# Patient Record
Sex: FEMALE | Employment: FULL TIME | URBAN - METROPOLITAN AREA
[De-identification: names, ages, dates, MRNs, and addresses within clinical notes are randomized per-mention and may not be internally consistent; named-entity substitution may affect disease eponyms.]

---

## 2020-10-20 ENCOUNTER — HOSPITAL ENCOUNTER (INPATIENT)
Age: 35
LOS: 1 days | Discharge: ANOTHER HOSPITAL | DRG: 885 | End: 2020-10-21
Attending: EMERGENCY MEDICINE | Admitting: PSYCHIATRY & NEUROLOGY
Payer: COMMERCIAL

## 2020-10-20 ENCOUNTER — APPOINTMENT (OUTPATIENT)
Dept: CT IMAGING | Age: 35
DRG: 885 | End: 2020-10-20
Attending: NURSE PRACTITIONER
Payer: COMMERCIAL

## 2020-10-20 VITALS
WEIGHT: 130 LBS | DIASTOLIC BLOOD PRESSURE: 74 MMHG | RESPIRATION RATE: 19 BRPM | OXYGEN SATURATION: 98 % | TEMPERATURE: 98.8 F | HEART RATE: 81 BPM | BODY MASS INDEX: 23.03 KG/M2 | SYSTOLIC BLOOD PRESSURE: 112 MMHG

## 2020-10-20 DIAGNOSIS — F32.A DEPRESSION, UNSPECIFIED DEPRESSION TYPE: Primary | ICD-10-CM

## 2020-10-20 PROBLEM — F32.9 MDD (MAJOR DEPRESSIVE DISORDER): Status: ACTIVE | Noted: 2020-10-20

## 2020-10-20 LAB
ALBUMIN SERPL-MCNC: 3.6 G/DL (ref 3.5–5)
ALBUMIN/GLOB SERPL: 1.3 {RATIO} (ref 1.1–2.2)
ALP SERPL-CCNC: 53 U/L (ref 45–117)
ALT SERPL-CCNC: 12 U/L (ref 12–78)
AMPHET UR QL SCN: POSITIVE
ANION GAP SERPL CALC-SCNC: 11 MMOL/L (ref 5–15)
APAP SERPL-MCNC: <2 UG/ML (ref 10–30)
APPEARANCE UR: ABNORMAL
AST SERPL-CCNC: 14 U/L (ref 15–37)
BACTERIA URNS QL MICRO: NEGATIVE /HPF
BARBITURATES UR QL SCN: NEGATIVE
BASOPHILS # BLD: 0 K/UL (ref 0–0.1)
BASOPHILS NFR BLD: 0 % (ref 0–1)
BENZODIAZ UR QL: POSITIVE
BILIRUB SERPL-MCNC: 0.3 MG/DL (ref 0.2–1)
BILIRUB UR QL: NEGATIVE
BUN SERPL-MCNC: 12 MG/DL (ref 6–20)
BUN/CREAT SERPL: 12 (ref 12–20)
CALCIUM SERPL-MCNC: 8.3 MG/DL (ref 8.5–10.1)
CANNABINOIDS UR QL SCN: POSITIVE
CHLORIDE SERPL-SCNC: 104 MMOL/L (ref 97–108)
CO2 SERPL-SCNC: 26 MMOL/L (ref 21–32)
COCAINE UR QL SCN: NEGATIVE
COLOR UR: ABNORMAL
COVID-19 RAPID TEST, COVR: NOT DETECTED
CREAT SERPL-MCNC: 0.97 MG/DL (ref 0.55–1.02)
DIFFERENTIAL METHOD BLD: ABNORMAL
DRUG SCRN COMMENT,DRGCM: ABNORMAL
EOSINOPHIL # BLD: 0 K/UL (ref 0–0.4)
EOSINOPHIL NFR BLD: 0 % (ref 0–7)
EPITH CASTS URNS QL MICRO: ABNORMAL /LPF
ERYTHROCYTE [DISTWIDTH] IN BLOOD BY AUTOMATED COUNT: 13.5 % (ref 11.5–14.5)
ETHANOL SERPL-MCNC: <10 MG/DL
GLOBULIN SER CALC-MCNC: 2.8 G/DL (ref 2–4)
GLUCOSE SERPL-MCNC: 94 MG/DL (ref 65–100)
GLUCOSE UR STRIP.AUTO-MCNC: NEGATIVE MG/DL
HCG UR QL: NEGATIVE
HCT VFR BLD AUTO: 30.6 % (ref 35–47)
HEALTH STATUS, XMCV2T: NORMAL
HGB BLD-MCNC: 10.3 G/DL (ref 11.5–16)
HGB UR QL STRIP: ABNORMAL
IMM GRANULOCYTES # BLD AUTO: 0 K/UL (ref 0–0.04)
IMM GRANULOCYTES NFR BLD AUTO: 0 % (ref 0–0.5)
KETONES UR QL STRIP.AUTO: ABNORMAL MG/DL
LEUKOCYTE ESTERASE UR QL STRIP.AUTO: ABNORMAL
LYMPHOCYTES # BLD: 1.8 K/UL (ref 0.8–3.5)
LYMPHOCYTES NFR BLD: 25 % (ref 12–49)
MCH RBC QN AUTO: 29 PG (ref 26–34)
MCHC RBC AUTO-ENTMCNC: 33.7 G/DL (ref 30–36.5)
MCV RBC AUTO: 86.2 FL (ref 80–99)
METHADONE UR QL: NEGATIVE
MONOCYTES # BLD: 0.6 K/UL (ref 0–1)
MONOCYTES NFR BLD: 8 % (ref 5–13)
NEUTS SEG # BLD: 4.9 K/UL (ref 1.8–8)
NEUTS SEG NFR BLD: 67 % (ref 32–75)
NITRITE UR QL STRIP.AUTO: NEGATIVE
NRBC # BLD: 0 K/UL (ref 0–0.01)
NRBC BLD-RTO: 0 PER 100 WBC
OPIATES UR QL: NEGATIVE
PCP UR QL: NEGATIVE
PH UR STRIP: 6 [PH] (ref 5–8)
PLATELET # BLD AUTO: 246 K/UL (ref 150–400)
PMV BLD AUTO: 9.8 FL (ref 8.9–12.9)
POTASSIUM SERPL-SCNC: 3.6 MMOL/L (ref 3.5–5.1)
PROT SERPL-MCNC: 6.4 G/DL (ref 6.4–8.2)
PROT UR STRIP-MCNC: NEGATIVE MG/DL
RBC # BLD AUTO: 3.55 M/UL (ref 3.8–5.2)
RBC #/AREA URNS HPF: ABNORMAL /HPF (ref 0–5)
SALICYLATES SERPL-MCNC: 1.9 MG/DL (ref 2.8–20)
SARS-COV-2, COV2: NOT DETECTED
SODIUM SERPL-SCNC: 141 MMOL/L (ref 136–145)
SOURCE, COVRS: NORMAL
SP GR UR REFRACTOMETRY: 1.01 (ref 1–1.03)
SPECIMEN SOURCE, FCOV2M: NORMAL
SPECIMEN SOURCE, FCOV2M: NORMAL
SPECIMEN TYPE, XMCV1T: NORMAL
UA: UC IF INDICATED,UAUC: ABNORMAL
UROBILINOGEN UR QL STRIP.AUTO: 1 EU/DL (ref 0.2–1)
WBC # BLD AUTO: 7.4 K/UL (ref 3.6–11)
WBC URNS QL MICRO: ABNORMAL /HPF (ref 0–4)

## 2020-10-20 PROCEDURE — 81001 URINALYSIS AUTO W/SCOPE: CPT

## 2020-10-20 PROCEDURE — 36415 COLL VENOUS BLD VENIPUNCTURE: CPT

## 2020-10-20 PROCEDURE — 85025 COMPLETE CBC W/AUTO DIFF WBC: CPT

## 2020-10-20 PROCEDURE — 87635 SARS-COV-2 COVID-19 AMP PRB: CPT

## 2020-10-20 PROCEDURE — 80053 COMPREHEN METABOLIC PANEL: CPT

## 2020-10-20 PROCEDURE — 74011250636 HC RX REV CODE- 250/636: Performed by: PHYSICIAN ASSISTANT

## 2020-10-20 PROCEDURE — 81025 URINE PREGNANCY TEST: CPT

## 2020-10-20 PROCEDURE — 99285 EMERGENCY DEPT VISIT HI MDM: CPT

## 2020-10-20 PROCEDURE — 65270000029 HC RM PRIVATE

## 2020-10-20 PROCEDURE — 74011250636 HC RX REV CODE- 250/636: Performed by: NURSE PRACTITIONER

## 2020-10-20 PROCEDURE — 70450 CT HEAD/BRAIN W/O DYE: CPT

## 2020-10-20 PROCEDURE — 80307 DRUG TEST PRSMV CHEM ANLYZR: CPT

## 2020-10-20 RX ORDER — ALPRAZOLAM 1 MG/1
2 TABLET ORAL
COMMUNITY

## 2020-10-20 RX ORDER — NALOXONE HYDROCHLORIDE 1 MG/ML
2 INJECTION INTRAMUSCULAR; INTRAVENOUS; SUBCUTANEOUS
Status: COMPLETED | OUTPATIENT
Start: 2020-10-20 | End: 2020-10-20

## 2020-10-20 RX ORDER — NALOXONE HYDROCHLORIDE 1 MG/ML
2 INJECTION INTRAMUSCULAR; INTRAVENOUS; SUBCUTANEOUS ONCE
Status: COMPLETED | OUTPATIENT
Start: 2020-10-20 | End: 2020-10-20

## 2020-10-20 RX ORDER — PROPRANOLOL HYDROCHLORIDE 60 MG/1
60 CAPSULE, EXTENDED RELEASE ORAL DAILY
COMMUNITY

## 2020-10-20 RX ORDER — SERTRALINE HYDROCHLORIDE 100 MG/1
100 TABLET, FILM COATED ORAL DAILY
COMMUNITY

## 2020-10-20 RX ADMIN — NALOXONE HYDROCHLORIDE 2 MG: 1 INJECTION PARENTERAL at 14:36

## 2020-10-20 RX ADMIN — NALOXONE HYDROCHLORIDE 2 MG: 1 INJECTION PARENTERAL at 15:25

## 2020-10-20 RX ADMIN — SODIUM CHLORIDE 1000 ML: 900 INJECTION, SOLUTION INTRAVENOUS at 18:53

## 2020-10-20 NOTE — ED NOTES
Emergency Department Nursing Plan of Care The Nursing Plan of Care is developed from the Nursing assessment and Emergency Department Attending provider initial evaluation. The plan of care may be reviewed in the ED Provider note. The Plan of Care was developed with the following considerations:  
Patient / Family readiness to learn indicated by:appropriate questions asked Persons(s) to be included in education: patient Barriers to Learning/Limitations:PT under ECO/TDO Signed Bakari Wagner RN   
10/20/2020   2:49 PM

## 2020-10-20 NOTE — ED NOTES
Upon arrival pt noted to be drowsy and not answering questions. Pt able to verify her birthday and SSN.
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
## 2020-10-20 NOTE — ED NOTES
Bedside and Verbal shift change report given to Parth Correia RN (oncoming nurse) by Isaura Posada RN (offgoing nurse). Report included the following information SBAR, ED Summary, MAR and Recent Results.

## 2020-10-20 NOTE — ED NOTES
Pt still appears drowsy after receiving Narcan intranasally Provider GrayNP at bedside when Narcan was administered.

## 2020-10-20 NOTE — ED NOTES
7th hour TDO at bedside with [employer] officer. Assisted pt to bedside commode for urine sample. Steady gait and staff assist x1. Pt very tearful asking if she is allowed to have visitors. [employer] removed bilateral wrist cuffs at this time. Skin intact.
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
## 2020-10-21 ENCOUNTER — HOSPITAL ENCOUNTER (INPATIENT)
Age: 35
LOS: 1 days | Discharge: HOME | DRG: 885 | End: 2020-10-21
Attending: PSYCHIATRY & NEUROLOGY | Admitting: PSYCHIATRY & NEUROLOGY
Payer: COMMERCIAL

## 2020-10-21 VITALS
RESPIRATION RATE: 16 BRPM | WEIGHT: 130 LBS | OXYGEN SATURATION: 99 % | TEMPERATURE: 97.8 F | HEART RATE: 89 BPM | BODY MASS INDEX: 21.66 KG/M2 | HEIGHT: 65 IN | SYSTOLIC BLOOD PRESSURE: 124 MMHG | DIASTOLIC BLOOD PRESSURE: 80 MMHG

## 2020-10-21 PROCEDURE — 74011250637 HC RX REV CODE- 250/637: Performed by: NURSE PRACTITIONER

## 2020-10-21 PROCEDURE — 65220000003 HC RM SEMIPRIVATE PSYCH

## 2020-10-21 RX ORDER — OLANZAPINE 5 MG/1
5 TABLET ORAL
Status: DISCONTINUED | OUTPATIENT
Start: 2020-10-21 | End: 2020-10-21 | Stop reason: HOSPADM

## 2020-10-21 RX ORDER — DIPHENHYDRAMINE HYDROCHLORIDE 50 MG/ML
50 INJECTION, SOLUTION INTRAMUSCULAR; INTRAVENOUS
Status: DISCONTINUED | OUTPATIENT
Start: 2020-10-21 | End: 2020-10-21 | Stop reason: HOSPADM

## 2020-10-21 RX ORDER — IBUPROFEN 400 MG/1
400 TABLET ORAL
Status: DISCONTINUED | OUTPATIENT
Start: 2020-10-21 | End: 2020-10-21 | Stop reason: HOSPADM

## 2020-10-21 RX ORDER — PHENOBARBITAL 64.8 MG/1
64.8 TABLET ORAL
Status: DISCONTINUED | OUTPATIENT
Start: 2020-10-21 | End: 2020-10-21 | Stop reason: HOSPADM

## 2020-10-21 RX ORDER — PHENOBARBITAL 32.4 MG/1
32.4 TABLET ORAL 4 TIMES DAILY
Status: DISCONTINUED | OUTPATIENT
Start: 2020-10-22 | End: 2020-10-21 | Stop reason: HOSPADM

## 2020-10-21 RX ORDER — PHENOBARBITAL 64.8 MG/1
64.8 TABLET ORAL 4 TIMES DAILY
Status: DISCONTINUED | OUTPATIENT
Start: 2020-10-21 | End: 2020-10-21 | Stop reason: HOSPADM

## 2020-10-21 RX ORDER — DEXTROAMPHETAMINE SACCHARATE, AMPHETAMINE ASPARTATE, DEXTROAMPHETAMINE SULFATE AND AMPHETAMINE SULFATE 5; 5; 5; 5 MG/1; MG/1; MG/1; MG/1
20 TABLET ORAL 3 TIMES DAILY
COMMUNITY

## 2020-10-21 RX ORDER — TRAZODONE HYDROCHLORIDE 50 MG/1
50 TABLET ORAL
Status: DISCONTINUED | OUTPATIENT
Start: 2020-10-21 | End: 2020-10-21

## 2020-10-21 RX ORDER — LORAZEPAM 2 MG/ML
1 INJECTION INTRAMUSCULAR
Status: DISCONTINUED | OUTPATIENT
Start: 2020-10-21 | End: 2020-10-21 | Stop reason: HOSPADM

## 2020-10-21 RX ORDER — HALOPERIDOL 5 MG/ML
5 INJECTION INTRAMUSCULAR
Status: DISCONTINUED | OUTPATIENT
Start: 2020-10-21 | End: 2020-10-21 | Stop reason: HOSPADM

## 2020-10-21 RX ORDER — CYCLOBENZAPRINE HCL 10 MG
10 TABLET ORAL DAILY
COMMUNITY

## 2020-10-21 RX ORDER — ADHESIVE BANDAGE
30 BANDAGE TOPICAL DAILY PRN
Status: DISCONTINUED | OUTPATIENT
Start: 2020-10-21 | End: 2020-10-21 | Stop reason: HOSPADM

## 2020-10-21 RX ORDER — HYDROXYZINE 50 MG/1
50 TABLET, FILM COATED ORAL
Status: DISCONTINUED | OUTPATIENT
Start: 2020-10-21 | End: 2020-10-21 | Stop reason: HOSPADM

## 2020-10-21 RX ORDER — PHENOBARBITAL 32.4 MG/1
32.4 TABLET ORAL 2 TIMES DAILY
Status: DISCONTINUED | OUTPATIENT
Start: 2020-10-23 | End: 2020-10-21 | Stop reason: HOSPADM

## 2020-10-21 RX ORDER — SPIRONOLACTONE 25 MG/1
25 TABLET ORAL
COMMUNITY

## 2020-10-21 RX ORDER — PHENOBARBITAL 32.4 MG/1
16.2 TABLET ORAL
Status: DISCONTINUED | OUTPATIENT
Start: 2020-10-24 | End: 2020-10-21 | Stop reason: HOSPADM

## 2020-10-21 RX ORDER — SERTRALINE HYDROCHLORIDE 50 MG/1
100 TABLET, FILM COATED ORAL DAILY
Status: DISCONTINUED | OUTPATIENT
Start: 2020-10-21 | End: 2020-10-21 | Stop reason: HOSPADM

## 2020-10-21 RX ORDER — ACETAMINOPHEN 325 MG/1
650 TABLET ORAL
Status: DISCONTINUED | OUTPATIENT
Start: 2020-10-21 | End: 2020-10-21 | Stop reason: HOSPADM

## 2020-10-21 RX ORDER — AMITRIPTYLINE HYDROCHLORIDE 25 MG/1
25 TABLET, FILM COATED ORAL
COMMUNITY

## 2020-10-21 RX ORDER — PHENOBARBITAL 32.4 MG/1
16.2 TABLET ORAL 2 TIMES DAILY
Status: DISCONTINUED | OUTPATIENT
Start: 2020-10-24 | End: 2020-10-21 | Stop reason: HOSPADM

## 2020-10-21 RX ORDER — PHENOBARBITAL 64.8 MG/1
60 TABLET ORAL
Status: DISCONTINUED | OUTPATIENT
Start: 2020-10-21 | End: 2020-10-21

## 2020-10-21 RX ORDER — ONDANSETRON HYDROCHLORIDE 8 MG/1
8 TABLET, FILM COATED ORAL
COMMUNITY

## 2020-10-21 RX ORDER — PHENOBARBITAL 32.4 MG/1
32.4 TABLET ORAL
Status: DISCONTINUED | OUTPATIENT
Start: 2020-10-22 | End: 2020-10-21 | Stop reason: HOSPADM

## 2020-10-21 RX ORDER — BENZTROPINE MESYLATE 1 MG/1
1 TABLET ORAL
Status: DISCONTINUED | OUTPATIENT
Start: 2020-10-21 | End: 2020-10-21 | Stop reason: HOSPADM

## 2020-10-21 RX ORDER — AMITRIPTYLINE HYDROCHLORIDE 25 MG/1
25 TABLET, FILM COATED ORAL
Status: DISCONTINUED | OUTPATIENT
Start: 2020-10-21 | End: 2020-10-21 | Stop reason: HOSPADM

## 2020-10-21 RX ADMIN — PHENOBARBITAL 64.8 MG: 64.8 TABLET ORAL at 13:06

## 2020-10-21 RX ADMIN — SERTRALINE HYDROCHLORIDE 100 MG: 50 TABLET ORAL at 13:05

## 2020-10-21 RX ADMIN — PHENOBARBITAL 64.8 MG: 64.8 TABLET ORAL at 18:05

## 2020-10-21 NOTE — BH NOTES
GROUP THERAPY PROGRESS NOTE Patient did not participate in Substance Abuse group. Elton Smith, Supervisee in Social Work

## 2020-10-21 NOTE — ED NOTES
Zaire Loredo again and asked for update on transport. They report that deputy should be en route. [employer] officer at bedside also radioed for update.

## 2020-10-21 NOTE — PROGRESS NOTES
Admission Medication Reconciliation: 
 
Information obtained from: [facility] [phone], Patient, Dr. Doris Arguello note RxQuery data available¹: Yes Comments/Recommendations: Updated PTA meds/reviewed patient's allergies. 1)  Verified PTA meds with patient's pharmacy. All meds prescribed by Dr. Abiel Tam ? Alprazolam, flexeril (neck pain), and sertraline last filled 10/19/20.  
? Sertraline was increased from 50 mg on 10/19/20 to 100 mg  
? Adderall last filled 10/18/20-- previously on Adderall ER 30 mg in 5/2020, 06/2020 
? Pharmacy stated there is no hx of any other antidepressant or antipsychotic filled other than above. RBHA:  
? Has not been seen by [facility], representative stated she is a resident of [address] [place] [address]  
? Requested notes from recent appointment be faxed over this morning. 2)  Medication changes (since last review): Added - Adderall 20 mg TID  
- Flexeril 10 mg QHS  
- Spironolactone 25 mg every day PRN 
- Amitriptyline 25 mg every day PRN for HA prophylaxis - Zofran 8 mg every day PRN Adjusted - Added dose and sig for sertraline, alprazolam 
 
Removed 
- None 3) VAPMP to be reviewed by pharmacist.  
  
[facility] pharmacy benefit data reflects medications filled and processed through the patient's insurance, however  
this data does NOT capture whether the medication was picked up or is currently being taken by the patient. Allergies:  Latex Significant PMH/Disease States:  
Past Medical History:  
Diagnosis Date  Anxiety  Depression  Fluid retention  Hypertension  Ill-defined condition   
 pituitary tumor  Sleep disorder  Substance abuse ([facility] [address]  Suicidal thoughts  Tachycardia  Trauma Chief Complaint for this Admission:  No chief complaint on file. Prior to Admission Medications:  
Prior to Admission Medications Prescriptions Last Dose Informant Taking?   
ALPRAZolam (Xanax) 1 mg tablet   No  
 Sig: Take 2 mg by mouth three (3) times daily as needed. amitriptyline (ELAVIL) 25 mg tablet   Yes Sig: Take 25 mg by mouth nightly. cyclobenzaprine (FLEXERIL) 10 mg tablet   Yes Sig: Take 10 mg by mouth daily. Indications: for neck pain  
dextroamphetamine-amphetamine (AdderalL) 20 mg tablet   Yes Sig: Take 20 mg by mouth three (3) times daily. ondansetron hcl (ZOFRAN) 8 mg tablet   Yes Sig: Take 8 mg by mouth daily as needed for Nausea or Vomiting. propranolol LA (INDERAL LA) 60 mg SR capsule   No  
Sig: Take 60 mg by mouth daily. sertraline (Zoloft) 100 mg tablet   No  
Sig: Take 100 mg by mouth daily. spironolactone (Aldactone) 25 mg tablet   Yes Sig: Take 25 mg by mouth daily as needed. Facility-Administered Medications: None Please contact the main inpatient pharmacy with any questions or concerns at [phone] and we will direct you to the clinical pharmacist covering this patient's care while in-house. Paulina Avendano, PharmD Candidate [id number]

## 2020-10-21 NOTE — BH NOTES
PSYCHOSOCIAL ASSESSMENT 
:Patient identifying info: 
Michel Narayanan is a 28 y.o., female admitted 10/21/2020  1:09 AM  
 
Presenting problem and precipitating factors:  
 
Mental status assessment: 
 
Strengths: supportive mom - safe housing Collateral information:  
 
Current psychiatric /substance abuse providers and contact info: she is seeing a therapist at [address] [place] [address] View psychiatric/substance abuse providers and response to treatment:  
 
Family history of mental illness or substance abuse:  
 
Substance abuse history:   
Social History Tobacco Use  Smoking status: Current Some Day Smoker Packs/day: 1.50  Smokeless tobacco: Never Used Substance Use Topics  Alcohol use: No  
  Frequency: Never Comment: Not a current drinker History of biomedical complications associated with substance abuse :none noted - Patient's current acceptance of treatment or motivation for change: 
 
Family constellation: single, no children Is significant other involved? Describe support system:  
 
Describe living arrangements and home environment: 
Lives at home with her mother Health issues:  
 
Trauma history: none noted Legal issues: no 
 
History of  service: no  
 
Financial status: employed - she just started job at [employer] Jain/cultural factors:  
 
Education/work history:  
 
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  
 
Olena Chamorro 
10/21/2020

## 2020-10-21 NOTE — CONSULTS
Hospitalist History and Physical 
 
Date of Service: 10/21/2020 Primary Care Provider: Ismael Anton MD 
Source of Information: Patient, chart review History of Presenting Illness:  
Angelo Gallegos is a 28 y.o. female with past medical history of depression, hypertension, neck pain and benign pituitary tumor who is admitted under TDO after she was found wandering on the street without shoes and acting bizarrely. UDS in the ED was positive for amphetamines (takes Adderall), benzodiazepine and THC. Covid was negative. CT of the head demonstrated no acute findings. Urine hCG negative for pregnancy. Patient states that she takes Flexeril for neck pain that resulted from a golf injury and she takes  no medications for hypertension. The benign pituitary tumor was likely an incidental finding on an MRI that was done approximately 3 years ago while she is being evaluated for migraine headaches. At the time she stated that along with the headaches, she had new onset of slurred speech (it has not completely resolved) and sometimes has \"out of body experiences\" and startles easily. She has had no further evaluation or treatment for this, but follows with her PCP for occasional reevaluation. The hospitalist group is consulted for medical management. Assessment & Plan Mental health disorder, not specified in available documentation · Evaluation and treatment per primary team 
 
PMH: Neck pain, hypertension, benign pituitary tumor · Would endorse Flexeril 10 mg p.o. 3 times daily if okay with primary team along with Tylenol · Monitor vital signs · Follow-up with PCP upon discharge Nicotine dependence · Will defer treatment to primary team, would endorse NRT patch/lozenges Thank you for giving us opportunity to participate in this patients care. Will sign off at this time, please re-consult if there are any further medical management needs or questions DVT Prophylaxis: Up ad jimy, none indicated Code status: Full Disposition: Per primary team  
 
Review of Systems: 
Review of Systems Constitutional: Negative for chills, fever, malaise/fatigue and weight loss. HENT: Negative for congestion, ear discharge, ear pain, hearing loss, nosebleeds and tinnitus. Eyes: Negative for blurred vision, photophobia, pain and discharge. Respiratory: Negative for cough, sputum production, shortness of breath and wheezing. Cardiovascular: Negative for chest pain, palpitations, orthopnea, claudication and leg swelling. Gastrointestinal: Negative for abdominal pain, constipation, diarrhea, heartburn, nausea and vomiting. Genitourinary: Negative for dysuria, flank pain, frequency and urgency. Musculoskeletal: Positive for neck pain. Negative for back pain, joint pain and myalgias. Skin: Negative for itching and rash. Neurological: Positive for speech change. Negative for dizziness, tingling, focal weakness, weakness and headaches. Endo/Heme/Allergies: Negative. LMP: 19 Oct 2020 (UDS neg) Past Medical History:  
Diagnosis Date  Anxiety  Depression  Fluid retention  Hypertension  Ill-defined condition   
 pituitary tumor  Sleep disorder  Substance abuse ([facility] [address]  Suicidal thoughts  Tachycardia  Trauma Past Surgical History:  
Procedure Laterality Date  HX FREE SKIN GRAFT    
 HX ORTHOPAEDIC    
 right foot  HX TONSILLECTOMY Prior to Admission medications Medication Sig Start Date End Date Taking? Authorizing Provider  
propranolol HCl (PROPRANOLOL PO) Take  by mouth. Ortiz Catherine MD  
alprazolam (XANAX PO) Take  by mouth. Ortiz Catherine MD  
sertraline HCl (ZOLOFT PO) Take  by mouth. Ortiz Catherine MD  
 
Allergies Allergen Reactions  Latex Hives History reviewed. No pertinent family history. SOCIAL HISTORY: 
 
FUNCTIONAL STATUS PRIOR TO ADMISSION: Ambulates Independently Patient resides:    With Family Smoking history:   Some Day Smoker Drugs:  Denies (UDS+ THC) Alcohol history:  None,   
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  
 
Physical Exam:  
General:  Alert, cooperative, no distress, appears stated age. Head:  Normocephalic, without obvious abnormality, atraumatic. Eyes:  Conjunctivae/corneas clear. PERRL, EOMs intact. Nose: Nares normal. Septum midline. Mucosa normal. No drainage or sinus tenderness. Throat: Lips, mucosa, and tongue normal. Teeth and gums normal.  
Neck:  Tight, limited ROM, symmetrical, trachea midline, no adenopathy, thyroid: no enlargement/tenderness/nodules, no carotid bruit and no JVD. Back:   Symmetric, no curvature. ROM normal. No CVA tenderness. Lungs:   Clear to auscultation bilaterally. Chest wall:  No tenderness or deformity. Heart:  Regular rate and tachy, S1, S2 normal, no murmur, click, rub or gallop. Abdomen:   Soft, obese, non-tender. Bowel sounds normal. No masses,  No organomegaly. Extremities: Extremities normal, atraumatic, no cyanosis or edema. Pulses: 2+ and symmetric all extremities. Skin: Skin color, texture, turgor normal. No rashes or lesions Neurologic: CNII-XII intact. No focal weakness Visit Vitals /60 Pulse 85 Temp 98.2 °F (36.8 °C) Resp 16 Ht 5' 5\" (1.651 m) Wt 59 kg (130 lb) SpO2 98% Breastfeeding No  
BMI 21.63 kg/m² Physical Exam 
 
Data Review:  
Lab results (past 7 days) Admission on 10/20/2020, Discharged on 10/21/2020 Component Date Value  AMPHETAMINES 10/20/2020 Positive*  
 BARBITURATES 10/20/2020 Negative  BENZODIAZEPINES 10/20/2020 Positive*  COCAINE 10/20/2020 Negative  METHADONE 10/20/2020 Negative  OPIATES 10/20/2020 Negative  PCP(PHENCYCLIDINE) 10/20/2020 Negative  THC (TH-CANNABINOL) 10/20/2020 Positive*  Drug screen comment 10/20/2020 (NOTE)  Color 10/20/2020 YELLOW/STRAW  Appearance 10/20/2020 CLOUDY*  Specific gravity 10/20/2020 1.010   
 pH (UA) 10/20/2020 6.0  Protein 10/20/2020 Negative  Glucose 10/20/2020 Negative  Ketone 10/20/2020 TRACE*  Bilirubin 10/20/2020 Negative  Blood 10/20/2020 LARGE*  Urobilinogen 10/20/2020 1.0  Nitrites 10/20/2020 Negative  Leukocyte Esterase 10/20/2020 TRACE*  WBC 10/20/2020 0-4  RBC 10/20/2020 20-50  Epithelial cells 10/20/2020 FEW  Bacteria 10/20/2020 Negative  UA:UC IF INDICATED 10/20/2020 CULTURE NOT INDICATED BY UA RESULT  WBC 10/20/2020 7.4  RBC 10/20/2020 3.55*  HGB 10/20/2020 10.3*  
 HCT 10/20/2020 30.6*  MCV 10/20/2020 86.2  MCH 10/20/2020 29.0  MCHC 10/20/2020 33.7  RDW 10/20/2020 13.5  PLATELET 73/66/0191 761  MPV 10/20/2020 9.8  NRBC 10/20/2020 0.0  ABSOLUTE NRBC 10/20/2020 0.00   
 NEUTROPHILS 10/20/2020 67   
 LYMPHOCYTES 10/20/2020 25   
 MONOCYTES 10/20/2020 8   
 EOSINOPHILS 10/20/2020 0   
 BASOPHILS 10/20/2020 0   
 IMMATURE GRANULOCYTES 10/20/2020 0  ABS. NEUTROPHILS 10/20/2020 4.9  ABS. LYMPHOCYTES 10/20/2020 1.8  ABS. MONOCYTES 10/20/2020 0.6  ABS. EOSINOPHILS 10/20/2020 0.0  ABS. BASOPHILS 10/20/2020 0.0  ABS. IMM. GRANS. 10/20/2020 0.0  DF 10/20/2020 AUTOMATED  Sodium 10/20/2020 141  Potassium 10/20/2020 3.6  Chloride 10/20/2020 104   
 CO2 10/20/2020 26   
 Anion gap 10/20/2020 11  Glucose 10/20/2020 94   
 BUN 10/20/2020 12   
 Creatinine 10/20/2020 0.97   
 BUN/Creatinine ratio 10/20/2020 12   
 GFR est AA 10/20/2020 >60   
 GFR est non-AA 10/20/2020 >60   
 Calcium 10/20/2020 8.3*  Bilirubin, total 10/20/2020 0.3  ALT (SGPT) 10/20/2020 12   
 AST (SGOT) 10/20/2020 14*  Alk. phosphatase 10/20/2020 53  Protein, total 10/20/2020 6.4  Albumin 10/20/2020 3.6  Globulin 10/20/2020 2.8  A-G Ratio 10/20/2020 1.3  ALCOHOL(ETHYL),SERUM 10/20/2020 <10  Specimen source 10/20/2020 Nasopharyngeal   
 Specimen source 10/20/2020 Nasopharyngeal   
 COVID-19 rapid test 10/20/2020 Not detected  Specimen type 10/20/2020 NP Swab   
 Health status 10/20/2020 Symptomatic Testing  Specimen source 10/20/2020 Nasopharyngeal   
 SARS-CoV-2 10/20/2020 Not detected  Pregnancy test,urine (PO* 10/20/2020 Negative  Acetaminophen level 10/20/2020 <2*  Salicylate level 09/63/9526 1.9* Imaging results (past 24 hours): No results found. EKG (most recent): No results found for this or any previous visit. Signed By: Oran Hammans, NP October 21, 2020 Oran Hammans, MS, NP-C 
[phone]  and via [employer]

## 2020-10-21 NOTE — PROGRESS NOTES
Problem: Altered Thought Process (Adult/Pediatric) Goal: *STG: Participates in treatment plan Outcome: Progressing Towards Goal 
Patient participated in treatment team.  Patient able to verbalize feelings, including recent stressors that have lead to her depressive state. Patient states she is taking 4-6mg of Xanax daily prescribe by PCP. Goal: *STG: Complies with medication therapy Outcome: Progressing Towards Goal 
Patient compliant with all scheduled medications. Goal: *STG: Attends activities and groups Outcome: Progressing Towards Goal 
Patient has remained isolative to room the entire shift, sleeping Problem: Patient Education: Go to Patient Education Activity Goal: Patient/Family Education Outcome: [address] Treatment Plan for [employer] Date Treatment Plan Initiated: 10/21/2020 Treatment Plan Modalities: 
Type of Modality Amount (x minutes) Frequency (x/week) Duration (x days) Name of Responsible Staff Community & wrap-up meetings to encourage peer interactions 13 7 1 Brent Goss Group psychotherapy to assist in building coping skills and internal controls 60 7 1 Elton Smith Therapeutic activity groups to build coping skills 60 7 1 Elton Smith Psychoeducation in group setting to address:  
Medication education [address] Coping skills 30 3 1 Elton Smith Relaxation techniques Elton Smith Symptom management Discharge planning [address] Spirituality 60 2 1 Chaplain Roman Urias 60 1 1 volunteer Recovery/AA/NA 
    volunteer Physician medication management 15 7 1 Dalton Delgado NP Family meeting/discharge planning [employer]

## 2020-10-21 NOTE — BH NOTES
GROUP THERAPY PROGRESS NOTE Patient did not participate in Process Group. Elton Smith, Supervisee in Social Work

## 2020-10-21 NOTE — BH NOTES
Behavioral Health Transition Record to Provider Patient Name: Augusto Boucher YOB: 1985 Medical Record Number: [id number] Date of Admission: 10/21/2020 Date of Discharge: 10/21/2020 Attending Provider: Leanne Moura MD 
Discharging Provider: Malachi Tinsley NP To contact this individual call [phone] and ask the  to page. If unavailable, ask to be transferred to [address] [place] [address] Provider on call. [facility] Provider will be available on call 24/7 and during holidays. Primary Care Provider: Ian Skaggs MD 
 
Allergies Allergen Reactions  Latex Hives Reason for Admission:Patient was admitted under TDO after she was found wandering on the street without shoes and acting bizarrely. She was later released by a  form her TDO hearing. Admission Diagnosis: MDD * No surgery found * Results for orders placed or performed during the hospital encounter of 10/20/20 SARS-COV-2, PCR Specimen: Nasopharyngeal  
Result Value Ref Range Specimen source Nasopharyngeal    
 SARS-CoV-2 Not detected NOTD DRUG SCREEN, URINE Result Value Ref Range AMPHETAMINES Positive (A) NEG    
 BARBITURATES Negative NEG BENZODIAZEPINES Positive (A) NEG    
 COCAINE Negative NEG METHADONE Negative NEG    
 OPIATES Negative NEG    
 PCP(PHENCYCLIDINE) Negative NEG    
 THC (TH-CANNABINOL) Positive (A) NEG Drug screen comment (NOTE) URINALYSIS W/ REFLEX CULTURE Specimen: Urine Result Value Ref Range Color YELLOW/STRAW Appearance CLOUDY (A) CLEAR Specific gravity 1.010 1.003 - 1.030    
 pH (UA) 6.0 5.0 - 8.0 Protein Negative NEG mg/dL Glucose Negative NEG mg/dL Ketone TRACE (A) NEG mg/dL Bilirubin Negative NEG Blood LARGE (A) NEG Urobilinogen 1.0 0.2 - 1.0 EU/dL Nitrites Negative NEG  Leukocyte Esterase TRACE (A) NEG    
 WBC 0-4 0 - 4 /hpf  
 RBC 20-50 0 - 5 /hpf  
 Epithelial cells FEW FEW /lpf Bacteria Negative NEG /hpf  
 UA:UC IF INDICATED CULTURE NOT INDICATED BY UA RESULT CNI    
CBC WITH AUTOMATED DIFF Result Value Ref Range WBC 7.4 3.6 - 11.0 K/uL  
 RBC 3.55 (L) 3.80 - 5.20 M/uL  
 HGB 10.3 (L) 11.5 - 16.0 g/dL HCT 30.6 (L) 35.0 - 47.0 % MCV 86.2 80.0 - 99.0 FL  
 MCH 29.0 26.0 - 34.0 PG  
 MCHC 33.7 30.0 - 36.5 g/dL  
 RDW 13.5 11.5 - 14.5 % PLATELET 118 530 - 442 K/uL MPV 9.8 8.9 - 12.9 FL  
 NRBC 0.0 0  WBC ABSOLUTE NRBC 0.00 0.00 - 0.01 K/uL NEUTROPHILS 67 32 - 75 % LYMPHOCYTES 25 12 - 49 % MONOCYTES 8 5 - 13 % EOSINOPHILS 0 0 - 7 % BASOPHILS 0 0 - 1 % IMMATURE GRANULOCYTES 0 0.0 - 0.5 % ABS. NEUTROPHILS 4.9 1.8 - 8.0 K/UL  
 ABS. LYMPHOCYTES 1.8 0.8 - 3.5 K/UL  
 ABS. MONOCYTES 0.6 0.0 - 1.0 K/UL  
 ABS. EOSINOPHILS 0.0 0.0 - 0.4 K/UL  
 ABS. BASOPHILS 0.0 0.0 - 0.1 K/UL  
 ABS. IMM. GRANS. 0.0 0.00 - 0.04 K/UL  
 DF AUTOMATED METABOLIC PANEL, COMPREHENSIVE Result Value Ref Range Sodium 141 136 - 145 mmol/L Potassium 3.6 3.5 - 5.1 mmol/L Chloride 104 97 - 108 mmol/L  
 CO2 26 21 - 32 mmol/L Anion gap 11 5 - 15 mmol/L Glucose 94 65 - 100 mg/dL BUN 12 6 - 20 MG/DL Creatinine 0.97 0.55 - 1.02 MG/DL  
 BUN/Creatinine ratio 12 12 - 20 GFR est AA >60 >60 ml/min/1.73m2 GFR est non-AA >60 >60 ml/min/1.73m2 Calcium 8.3 (L) 8.5 - 10.1 MG/DL Bilirubin, total 0.3 0.2 - 1.0 MG/DL  
 ALT (SGPT) 12 12 - 78 U/L  
 AST (SGOT) 14 (L) 15 - 37 U/L Alk. phosphatase 53 45 - 117 U/L Protein, total 6.4 6.4 - 8.2 g/dL Albumin 3.6 3.5 - 5.0 g/dL Globulin 2.8 2.0 - 4.0 g/dL A-G Ratio 1.3 1.1 - 2.2 ETHYL ALCOHOL Result Value Ref Range ALCOHOL(ETHYL),SERUM <10 <10 MG/DL  
SARS-COV-2 Result Value Ref Range Specimen source Nasopharyngeal    
 Specimen source Nasopharyngeal    
 COVID-19 rapid test Not detected NOTD Specimen type NP Swab Health status Symptomatic Testing ACETAMINOPHEN Result Value Ref Range Acetaminophen level <2 (L) 10 - 30 ug/mL SALICYLATE Result Value Ref Range Salicylate level 1.9 (L) 2.8 - 20.0 MG/DL  
HCG URINE, QL. - POC Result Value Ref Range Pregnancy test,urine (POC) Negative NEG PATIENT WAS RELEASED FROM HER TDO HEARING BY THE . Immunizations administered during this encounter: There is no immunization history on file for this patient. Screening for Metabolic Disorders for Patients on Antipsychotic Medications 
(Data obtained from the EMR) Estimated Body Mass Index Estimated body mass index is 21.63 kg/m² as calculated from the following: 
  Height as of this encounter: 5' 5\" (1.651 m). Weight as of this encounter: 59 kg (130 lb). Vital Signs/Blood Pressure Visit Vitals /80 (BP 1 Location: Right arm, BP Patient Position: Sitting) Pulse 89 Temp 97.8 °F (36.6 °C) Resp 16 Ht 5' 5\" (1.651 m) Wt 59 kg (130 lb) SpO2 99% Breastfeeding No  
BMI 21.63 kg/m² Blood Glucose/Hemoglobin A1c Lab Results Component Value Date/Time Glucose 94 10/20/2020 02:08 PM  
 Glucose (POC) 98 05/20/2016 02:26 PM  
 
 
No results found for: HBA1C, HGBE8, NIQ7EBCW Lipid Panel No results found for: CHOL, CHOLX, CHLST, CHOLV, 173645, HDL, HDLP, LDL, LDLC, DLDLP, TGLX, TRIGL, TRIGP, CHHD, CHHDX PATIENT WAS RELEASED FROM HER TDO HEARING BY THE . Discharge Diagnosis: major depressive disorder Discharge Plan: Patient was released from TDO hearing by . Patient was discharged into care of mother with plans to continue seeing her outpatient therapist Amanda Schultz, [facility]. She agreed to seek psychiatric medications management through [address] [place] [address] if [facility] and PCP do not make referral to a specific provider.  
 
The patient Dwight Zavala exhibits the ability to control behavior in a less restrictive environment. Patient's level of functioning is improving. No assaultive/destructive behavior has been observed for the past 24 hours. No suicidal/homicidal threat or behavior has been observed for the past 24 hours. There is no evidence of serious medication side effects. Patient has not been in physical or protective restraints for at least the past 24 hours. If weapons involved, how are they secured? No weapons involved Is patient aware of and in agreement with discharge plan? Yes patient was released from TDO hearing. Arrangements for medication: No prescriptions given to patient as she was released from TDO hearing. Copy of discharge instructions to provider?:  Sent to [address] [place] [address] ([phone]) [facility] as patient was released from TDO hearing Arrangements for transportation home:  Patient to coordinate as they were released form TDO hearing. Keep all follow up appointments as scheduled, continue to take prescribed medications per physician instructions. Mental health crisis number:  886 or your local mental health crisis line number at [phone] SAFETY PLAN 
 
A suicide Safety Plan is a document that supports someone when they are having thoughts of suicide. Warning Signs that indicate a suicidal crisis may be developing: What (situations, thoughts, feelings, body sensations, behaviors, etc.) do you experience that lets you know you are beginning to think about suicide? Patient was released from TDO hearing and declined to complete safety plan. Internal Coping Strategies:  What things can I do (relaxation techniques, hobbies, physical activities, etc.) to take my mind off my problems without contacting another person? Patient was released from TDO hearing and declined to complete safety plan. People and social settings that provide distraction: Who can I call or where can I go to distract me? Patient was released from TDO hearing and declined to complete safety plan. People whom I can ask for help: Who can I call when I need help - for example, friends, family, clergy, someone else? Patient was released from TDO hearing and declined to complete safety plan. Professionals or [address] [place] [address] I can contact during a crisis: Who can I call for help - for example, my doctor, my psychiatrist, my psychologist, a mental health provider, a suicide hotline? 1. Clinician Name: Glenis Agarwal at [address]     Phone: [phone] 2. Suicide Prevention Lifeline: 6-698-407-TALK (7045) 3. [address] [place] [address] Emergency Services -  for example, [address] suicide hotline, Yuliana Zuniga Hotline: 022 Emergency Services Address: [facility]; [address] [place] [address]; [facility], [address] [place] [address] Emergency Services FGPQA:[phone] Making the environment safe: How can I make my environment (house/apartment/living space) safer? For example, can I remove guns, medications, and other items? 1. Program you local crisis number at [phone]  into your phone. 2. Have your support system program your local crisis number into their phone. Discharge Medication List and Instructions:  
Current Discharge Medication List  
  
PATIENT WAS RELEASED FROM HER TDO HEARING BY  [address]. Unresulted Labs (24h ago, onward) Start     Ordered 10/22/20 0000  TSH, 3RD GENERATION - DO NOT ORDER IF PATIENT HAS RECEIVED THIS LAB WITHIN THE LAST 8 WEEKS  ONE TIME,   R Comments:  Do not repeat lab if already done in the ED prior to arrival on unit. 10/21/20 0122 To obtain results of studies pending at discharge, please contact [phone] Follow-up Information Follow up With Specialties Details Why Contact Info  [facility]   Call on 10/22/2020 call [phone] and ask to complete an intake assessment to be linked with mental health services, specifically medication managment [address] [facility], [address] 
[phone] Kary Moore [facility]  Schedule an appointment as soon as possible for a visit continue telehealth counesling with your exisitng provider. Joel 8977 [address] [facility], [address], [address], [address] Tel: [phone] Fax: [phone] Olayinka Dodge MD Family Medicine Schedule an appointment as soon as possible for a visit  [address] [facility] [address] 
[phone] Advanced Directive:  
Does the patient have an appointed surrogate decision maker? No 
Does the patient have a Medical Advance Directive? No 
Does the patient have a Psychiatric Advance Directive? No 
If the patient does not have a surrogate or Medical Advance Directive AND Psychiatric Advance Directive, the patient was offered information on these advance directives Patient declined to complete Patient Instructions: Please continue all medications until otherwise directed by physician. Tobacco Cessation Discharge Plan:  
Is the patient a smoker and needs referral for smoking cessation? Yes Patient referred to the following for smoking cessation with an appointment? Refused Patient was offered medication to assist with smoking cessation at discharge? Refused Was education for smoking cessation added to the discharge instructions? Yes Alcohol/Substance Abuse Discharge Plan:  
Does the patient have a history of substance/alcohol abuse and requires a referral for treatment? No 
Patient referred to the following for substance/alcohol abuse treatment with an appointment? Not applicable Patient was offered medication to assist with alcohol cessation at discharge? Not applicable Was education for substance/alcohol abuse added to discharge instructions?  No 
 
 Patient discharged to Home; discussed with patient/caregiver and provided to the patient/caregiver either in hard copy or electronically.

## 2020-10-21 NOTE — ED NOTES
Spoke with [employer] They report they did not know about transport, but will be contacting someone to transport now.

## 2020-10-21 NOTE — PROGRESS NOTES
Problem: Altered Thought Process (Adult/Pediatric) Goal: *STG: Participates in treatment plan Outcome: Resolved/Met Goal: *STG: Remains safe in hospital 
Outcome: Resolved/Met Goal: *STG: Seeks staff when feelings of anxiety and fear arise Outcome: Resolved/Met Goal: *STG: Complies with medication therapy Outcome: Resolved/Met Goal: *STG: Attends activities and groups Outcome: Resolved/Met Goal: *STG: Decreased delusional thinking Outcome: Resolved/Met Goal: *STG: Decreased hallucinations Outcome: Resolved/Met Goal: *STG: Absence of lethality Outcome: Resolved/Met Goal: *STG: Demonstrates ability to understand and use improved judgment in daily activities and relationships Outcome: Resolved/Met Goal: *LTG: Returns to baseline functioning Outcome: Resolved/Met Goal: Interventions Outcome: Resolved/Met Problem: Patient Education: Go to Patient Education Activity Goal: Patient/Family Education Outcome: Resolved/Met Problem: Falls - Risk of 
Goal: *Absence of Falls Description: Document Lemmiguel Calabrese Fall Risk and appropriate interventions in the flowsheet. Outcome: Resolved/Met Note: Fall Risk Interventions: 
  
 
  
 
Medication Interventions: Teach patient to arise slowly Problem: Patient Education: Go to Patient Education Activity Goal: Patient/Family Education Outcome: Resolved/Met Problem: Discharge Planning Goal: *Discharge to safe environment Outcome: Resolved/Met Goal: *Knowledge of medication management Outcome: Resolved/Met Goal: *Knowledge of discharge instructions Outcome: Resolved/Met 
 
1640   Pt is released from her TDO hearing. Pt discharged per MD order. All belongings returned, upon discharge. Pt verbally contracts for safety. Copy of discharge instructions given to patient. Pt verbalizes understanding.

## 2020-10-21 NOTE — PROGRESS NOTES
Problem: Discharge Planning Goal: *Discharge to safe environment Outcome: Progressing Towards Goal 
Note: Safe housing Supportive mother Goal: *Knowledge of medication management Outcome: Progressing Towards Goal 
Note: Willing to take medications Goal: *Knowledge of discharge instructions Outcome: Progressing Towards Goal 
Note: She is able to verbalize discharge instructions

## 2020-10-21 NOTE — BH NOTES
MARIELO, received from [facility], flat, depressed, and tearful Denies SI/HI +smoker-cessation education given Denies ETOH Skin intact Primary Nurse Annel Velazco and Annette Wu, RN performed a dual skin assessment on this patient No impairment noted Diego score is 23 
 
 
0620-NP Malicko up linda see patient for H+P.

## 2020-10-21 NOTE — BH NOTES
Answering service 2019 2:16 pm   Pt son has the influenza A, is getting worse and she has to take care of him because his wife is pregnant  Could Dennis Nageotte and her  Laura Mendenhall  55 get the alejandro flu  TRANSFER - IN REPORT: 
 
Verbal report received from [facility] ED(name) on Erica Bro  being received from [facility] ED(unit) for routine progression of care Report consisted of patients Situation, Background, Assessment and  
Recommendations(SBAR). Information from the following report(s) SBAR was reviewed with the receiving nurse. Opportunity for questions and clarification was provided. Assessment completed upon patients arrival to unit and care assumed. [facility] called [facility], as pt has not arrived. Per Stephanie Jenkins TDO has not been served. Writer requested that [facility] call the unit at [phone] and ask for Anahi Peck, to provide Anahi Peck with update, when TDO is actually served.

## 2020-10-21 NOTE — ED NOTES
Katherine meza in ED for transport. ESPINOZA completed. Pt ambulatory out of ED in bilateral wrist and bilateral ankle restraints placed by [facility].

## 2020-10-21 NOTE — INTERDISCIPLINARY ROUNDS
Behavioral Health Interdisciplinary Rounds Patient Name: Konrad Irwin  Age: 28 y.o. Room/Bed:  726/02 Primary Diagnosis: <principal problem not specified> Admission Status: TDO Patrick Hearing Team    
Readmission within 30 days: no 
Power of  in place: no 
Patient requires a blocked bed: no          Reason for blocked bed: VTE Prophylaxis: No 
 
Mobility needs/Fall risk: no 
Flu Vaccine : no  
Nutritional Plan: no 
Consults: H+P Labs/Testing due today?: no 
 
Sleep hours:  4.5 Participation in Care/Groups: New admit Medication Compliant?: New admit PRNS (last 24 hours): None Restraints (last 24 hours):  no 
  
CIWA (range last 24 hours): CIWA-Ar Total: 2 COWS (range last 24 hours): Alcohol screening (AUDIT) completed -   AUDIT Score: 0 If applicable, date SBIRT discussed in treatment team AND documented:  
AUDIT Screen Score: AUDIT Score: 0 Tobacco - patient is a smoker: Have You Used Tobacco in the Past 30 Days: Yes Illegal Drugs use: Have You Used Any Illegal Substances Over the Past 12 Months: Yes 
 
24 hour chart check complete: yes Patient goal(s) for today:  
Treatment team focus/goals: Plan to set up her hearing and assess for medications discharge needs. Progress note : She was pleasant , tearful and stated she had been depressed due to her issues. LOS:  0  Expected LOS: TBD Financial concerns/prescription coverage:  Medicaid Family contact: She refused to sigh a AILYN for her mother Family requesting physician contact today:  
Discharge plan:she lives at home with her mother. Access to weapons :  no Outpatient provider(s): she is seeing a PCP Patient's preferred phone number for follow up call :  
 
Participating treatment team members: Konrad Irwin, Pranav Shen, ODIN - Bony King RN

## 2020-10-21 NOTE — DISCHARGE INSTRUCTIONS
DISCHARGE SUMMARY    Arvid Pac  : 1985  MRN: [id number]    The patient Richy Gaspar exhibits the ability to control behavior in a less restrictive environment. Patient's level of functioning is improving. No assaultive/destructive behavior has been observed for the past 24 hours. No suicidal/homicidal threat or behavior has been observed for the past 24 hours. There is no evidence of serious medication side effects. Patient has not been in physical or protective restraints for at least the past 24 hours. If weapons involved, how are they secured? No weapons involved     Is patient aware of and in agreement with discharge plan? Yes patient was released from TDO hearing. Arrangements for medication: No prescriptions given to patient as she was released from TDO hearing. Copy of discharge instructions to provider?:  Sent to [address] [place] [address] ([phone]) and [facility] as patient was released from TDO hearing    Arrangements for transportation home:  Patient to coordinate as they were released form TDO hearing. Keep all follow up appointments as scheduled, continue to take prescribed medications per physician instructions. Mental health crisis number:  129 or your local mental health crisis line number at [address] [place] [address]    A suicide Safety Plan is a document that supports someone when they are having thoughts of suicide. Warning Signs that indicate a suicidal crisis may be developing: What (situations, thoughts, feelings, body sensations, behaviors, etc.) do you experience that lets you know you are beginning to think about suicide? 1. Suicidal thoughts       Internal Coping Strategies:  What things can I do (relaxation techniques, hobbies, physical activities, etc.) to take my mind off my problems without contacting another person? 1. Meditation  2.  Prayer  3. Reading    People and social settings that provide distraction: Who can I call or where can I go to distract me? 1. Name: Mom                                    Phone: [phone]   2. Name: [facility]                                    Phone:   3. Place:  [facility]     4. Place:     People whom I can ask for help: Who can I call when I need help - for example, friends, family, clergy, someone else? 1. Name: Reagan Carbajal -therapist                  Phone: unknown  2. Name:  Dr. Tracey Hou                          Phone: [phone]  3. Name:                             Phone:    Professionals or [address] [place], [place] [address]vd I can contact during a crisis: Who can I call for help - for example, my doctor, my psychiatrist, my psychologist, a mental health provider, a suicide hotline? 1. Clinician Name: Dajuan De Dios at [address]     Phone: [phone]    2. Suicide Prevention Lifeline: 0-450-332-TALK (6542)    3. [address] [place] [address] Emergency Services -  for example, [address] [facility] suicide hotline, [facility] Hotline: 211      Emergency Services Address: [address] [place] [address]; [address] [place] [address]      Emergency Services [facility]:[phone]     Making the environment safe: How can I make my environment (house/apartment/living space) safer? For example, can I remove guns, medications, and other items? 1. Program you local crisis number at [phone]  into your phone. 2. Have your support system program your local crisis number into their phone.

## 2020-10-21 NOTE — ED NOTES
TRANSFER - OUT REPORT: 
 
Verbal report given to Fiorella Garcia RN by Godwin Hassan (name) on Konrad So  being transferred to [facility] (unit) for routine progression of care Report consisted of patients Situation, Background, Assessment and  
Recommendations(SBAR). Information from the following report(s) SBAR, Kardex, ED Summary, [facility] and Recent Results was reviewed with the receiving nurse. Lines:  
Peripheral IV 10/20/20 Left Antecubital (Active) Site Assessment Clean, dry, & intact 10/20/20 1804 Phlebitis Assessment 0 10/20/20 1804 Infiltration Assessment 0 10/20/20 1804 Dressing Status Clean, dry, & intact 10/20/20 1804 Dressing Type Transparent 10/20/20 1804 Hub Color/Line Status Pink;Patent; Flushed 10/20/20 1804 Alcohol Cap Used Yes 10/20/20 1804 Opportunity for questions and clarification was provided. Patient transported with: 
Pt personal belongings ESPINOZA PUGH

## 2020-10-22 NOTE — H&P
[address] [facility] PSYCH HISTORY AND PHYSICAL Name:  Bernice Middleton 
MR#:  [id number] :  1985 ACCOUNT #:  [de-identified] ADMIT DATE:  10/21/2020 INITIAL PSYCHIATRIC EVALUATION 
 
CHIEF COMPLAINT:  \"The police brought me here. \" HISTORY OF PRESENT ILLNESS:  The patient is a 71-year-old woman who was admitted to [address] [place] [address] on a temporary long term order and later got released from her O hearing. She states that she has been feeling depressed, she lost her girlfriend 3 months ago, she lost her license, she could not find a job. She tells me that she overslept in her job, her mom also kept had to wake her up, she stated she was in deep sleep, so she went to work only wearing pajamas and next thing she knew she was fired from her job. Her urine drug screen results are positive for amphetamines, benzodiazepines, and marijuana. She states that her primary care physician is currently prescribing her Xanax, and she takes up to 6 mg a day. She is also on 60 mg of Adderall, and she smokes marijuana about once a week. She has a history of heavy alcohol drinking. She says that she used to work as a registered nurse for 4 years at [employer], but there was one time that her speech was slurred, so it was suspected that she was under the influence of substances and so she was fired. She also worked as a traveling nurse. She shares that her license was suspended indefinitely from substance-related issues. According to the pre-screen, someone called 911 because the patient was walking into the median [place], did not have shoes and appeared intoxicated and unstable on her feet. She was wandering around and was not making any sense. She shared that she has been struggling with depression and suicidal ideation since age of 12.   She denies suicidal ideation, homicidal ideation, auditory or visual hallucination. She is admitted to the inpatient psychiatric unit for further stabilization and treatment. PAST MEDICAL HISTORY:  See H and P. Past Medical History:  
Diagnosis Date  Anxiety  Depression  Fluid retention  Hypertension  Ill-defined condition   
 pituitary tumor  Sleep disorder  Substance abuse ([facility] [address]  Suicidal thoughts  Tachycardia  Trauma Labs: (reviewed/updated 10/22/2020) No data found. Labs Reviewed - No data to display Lab Results Component Value Date/Time Sodium 141 10/20/2020 02:08 PM  
 Potassium 3.6 10/20/2020 02:08 PM  
 Chloride 104 10/20/2020 02:08 PM  
 CO2 26 10/20/2020 02:08 PM  
 Anion gap 11 10/20/2020 02:08 PM  
 Glucose 94 10/20/2020 02:08 PM  
 BUN 12 10/20/2020 02:08 PM  
 Creatinine 0.97 10/20/2020 02:08 PM  
 BUN/Creatinine ratio 12 10/20/2020 02:08 PM  
 GFR est AA >60 10/20/2020 02:08 PM  
 GFR est non-AA >60 10/20/2020 02:08 PM  
 Calcium 8.3 (L) 10/20/2020 02:08 PM  
 Bilirubin, total 0.3 10/20/2020 02:08 PM  
 Alk. phosphatase 53 10/20/2020 02:08 PM  
 Protein, total 6.4 10/20/2020 02:08 PM  
 Albumin 3.6 10/20/2020 02:08 PM  
 Globulin 2.8 10/20/2020 02:08 PM  
 A-G Ratio 1.3 10/20/2020 02:08 PM  
 ALT (SGPT) 12 10/20/2020 02:08 PM  
 
Admission on 10/20/2020, Discharged on 10/21/2020 Component Date Value Ref Range Status  AMPHETAMINES 10/20/2020 Positive* NEG   Final  
 BARBITURATES 10/20/2020 Negative  NEG   Final  
 BENZODIAZEPINES 10/20/2020 Positive* NEG   Final  
 COCAINE 10/20/2020 Negative  NEG   Final  
 METHADONE 10/20/2020 Negative  NEG   Final  
 OPIATES 10/20/2020 Negative  NEG   Final  
 PCP(PHENCYCLIDINE) 10/20/2020 Negative  NEG   Final  
 THC (TH-CANNABINOL) 10/20/2020 Positive* NEG   Final  
 Drug screen comment 10/20/2020 (NOTE)   Final  
 Color 10/20/2020 YELLOW/STRAW    Final  
 Appearance 10/20/2020 CLOUDY* CLEAR   Final  
  Specific gravity 10/20/2020 1.010  1.003 - 1.030   Final  
 pH (UA) 10/20/2020 6.0  5.0 - 8.0   Final  
 Protein 10/20/2020 Negative  NEG mg/dL Final  
 Glucose 10/20/2020 Negative  NEG mg/dL Final  
 Ketone 10/20/2020 TRACE* NEG mg/dL Final  
 Bilirubin 10/20/2020 Negative  NEG   Final  
 Blood 10/20/2020 LARGE* NEG   Final  
 Urobilinogen 10/20/2020 1.0  0.2 - 1.0 EU/dL Final  
 Nitrites 10/20/2020 Negative  NEG   Final  
 Leukocyte Esterase 10/20/2020 TRACE* NEG   Final  
 WBC 10/20/2020 0-4  0 - 4 /hpf Final  
 RBC 10/20/2020 20-50  0 - 5 /hpf Final  
 Epithelial cells 10/20/2020 FEW  FEW /lpf Final  
 Bacteria 10/20/2020 Negative  NEG /hpf Final  
 UA:UC IF INDICATED 10/20/2020 CULTURE NOT INDICATED BY UA RESULT  CNI   Final  
 WBC 10/20/2020 7.4  3.6 - 11.0 K/uL Final  
 RBC 10/20/2020 3.55* 3.80 - 5.20 M/uL Final  
 HGB 10/20/2020 10.3* 11.5 - 16.0 g/dL Final  
 HCT 10/20/2020 30.6* 35.0 - 47.0 % Final  
 MCV 10/20/2020 86.2  80.0 - 99.0 FL Final  
 MCH 10/20/2020 29.0  26.0 - 34.0 PG Final  
 MCHC 10/20/2020 33.7  30.0 - 36.5 g/dL Final  
 RDW 10/20/2020 13.5  11.5 - 14.5 % Final  
 PLATELET 97/84/4919 688  150 - 400 K/uL Final  
 MPV 10/20/2020 9.8  8.9 - 12.9 FL Final  
 NRBC 10/20/2020 0.0  0  WBC Final  
 ABSOLUTE NRBC 10/20/2020 0.00  0.00 - 0.01 K/uL Final  
 NEUTROPHILS 10/20/2020 67  32 - 75 % Final  
 LYMPHOCYTES 10/20/2020 25  12 - 49 % Final  
 MONOCYTES 10/20/2020 8  5 - 13 % Final  
 EOSINOPHILS 10/20/2020 0  0 - 7 % Final  
 BASOPHILS 10/20/2020 0  0 - 1 % Final  
 IMMATURE GRANULOCYTES 10/20/2020 0  0.0 - 0.5 % Final  
 ABS. NEUTROPHILS 10/20/2020 4.9  1.8 - 8.0 K/UL Final  
 ABS. LYMPHOCYTES 10/20/2020 1.8  0.8 - 3.5 K/UL Final  
 ABS. MONOCYTES 10/20/2020 0.6  0.0 - 1.0 K/UL Final  
 ABS. EOSINOPHILS 10/20/2020 0.0  0.0 - 0.4 K/UL Final  
 ABS.  BASOPHILS 10/20/2020 0.0  0.0 - 0.1 K/UL Final  
  ABS. IMM. GRANS. 10/20/2020 0.0  0.00 - 0.04 K/UL Final  
 DF 10/20/2020 AUTOMATED    Final  
 Sodium 10/20/2020 141  136 - 145 mmol/L Final  
 Potassium 10/20/2020 3.6  3.5 - 5.1 mmol/L Final  
 Chloride 10/20/2020 104  97 - 108 mmol/L Final  
 CO2 10/20/2020 26  21 - 32 mmol/L Final  
 Anion gap 10/20/2020 11  5 - 15 mmol/L Final  
 Glucose 10/20/2020 94  65 - 100 mg/dL Final  
 BUN 10/20/2020 12  6 - 20 MG/DL Final  
 Creatinine 10/20/2020 0.97  0.55 - 1.02 MG/DL Final  
 BUN/Creatinine ratio 10/20/2020 12  12 - 20   Final  
 GFR est AA 10/20/2020 >60  >60 ml/min/1.73m2 Final  
 GFR est non-AA 10/20/2020 >60  >60 ml/min/1.73m2 Final  
 Calcium 10/20/2020 8.3* 8.5 - 10.1 MG/DL Final  
 Bilirubin, total 10/20/2020 0.3  0.2 - 1.0 MG/DL Final  
 ALT (SGPT) 10/20/2020 12  12 - 78 U/L Final  
 AST (SGOT) 10/20/2020 14* 15 - 37 U/L Final  
 Alk. phosphatase 10/20/2020 53  45 - 117 U/L Final  
 Protein, total 10/20/2020 6.4  6.4 - 8.2 g/dL Final  
 Albumin 10/20/2020 3.6  3.5 - 5.0 g/dL Final  
 Globulin 10/20/2020 2.8  2.0 - 4.0 g/dL Final  
 A-G Ratio 10/20/2020 1.3  1.1 - 2.2   Final  
 ALCOHOL(ETHYL),SERUM 10/20/2020 <10  <10 MG/DL Final  
 Specimen source 10/20/2020 Nasopharyngeal    Final  
 Specimen source 10/20/2020 Nasopharyngeal    Final  
 COVID-19 rapid test 10/20/2020 Not detected  NOTD   Final  
 Specimen type 10/20/2020 NP Swab    Final  
 Health status 10/20/2020 Symptomatic Testing    Final  
 Specimen source 10/20/2020 Nasopharyngeal    Final  
 SARS-CoV-2 10/20/2020 Not detected  NOTD   Final  
 Pregnancy test,urine (POC) 10/20/2020 Negative  NEG   Final  
 Acetaminophen level 10/20/2020 <2* 10 - 30 ug/mL Final  
 Salicylate level 18/41/1769 1.9* 2.8 - 20.0 MG/DL Final  
 
Vitals:  
 10/21/20 0126 10/21/20 0148 10/21/20 0902 10/21/20 1528 BP: 112/60  123/77 124/80 Pulse: 85  82 89 Resp: 16  16 16 Temp: 98.2 °F (36.8 °C)  98.7 °F (37.1 °C) 97.8 °F (36.6 °C) SpO2: 98%  98% 99% Weight:  59 kg (130 lb) Height:  5' 5\" (1.651 m) No results found for this or any previous visit (from the past 24 hour(s)). RADIOLOGY REPORTS: 
No results found for this or any previous visit. Ct Head Wo Cont Result Date: 10/20/2020 EXAM: CT HEAD WO CONT INDICATION: drowsy state and altered; hx of pituitary tumor COMPARISON: CT 5/20/2018. CONTRAST: None. TECHNIQUE: Unenhanced CT of the head was performed using 5 mm images. Brain and bone windows were generated. Coronal and sagittal reformats. CT dose reduction was achieved through use of a standardized protocol tailored for this examination and automatic exposure control for dose modulation. FINDINGS: The ventricles and sulci are normal in size, shape and configuration. . There is no significant white matter disease. There is no intracranial hemorrhage, extra-axial collection, or mass effect. The basilar cisterns are open. No CT evidence of acute infarct. The bone windows demonstrate no abnormalities. The visualized portions of the paranasal sinuses and mastoid air cells are clear. IMPRESSION: No acute findings. PAST PSYCHIATRIC HISTORY: This is her first psychiatric inpatient admission. She is seeing a therapist, on an outpatient basis. She stated her primary care physician is currently managing her medications. She is on Zoloft, Elavil, Xanax, Adderall, and she states that her primary care physician has diagnosed her with anxiety and ADHD. Her urine drug screen is positive for amphetamines and benzodiazepines. PSYCHOSOCIAL HISTORY:  She is single. She has no children. She lives with her mother. MENTAL STATUS EXAMINATION:  She is alert and oriented in all spheres. She is dressed in street clothes. She reports her mood is good. Affect is reactive. Speech normal rate and rhythm. Thought process logical and goal directed. Memory seems intact. Intelligence seems average.  She denies si hi or avh. Insight is poor. Judgment is poor. ASSETS AND STRENGTHS:  The patient appears to be in good physical health. She states her mother is supportive. ASSESSMENT AND PLAN: The patient will be discharged today as per the result of her TDO hearing. JENI CERNA NP 
 
 
[id number] 
D:  10/21/2020 21:31 
T:  10/22/2020 1:15 JOB #:  [id number]

## 2021-01-01 ENCOUNTER — HOSPITAL ENCOUNTER (EMERGENCY)
Age: 36
Discharge: HOME | End: 2021-09-29
Attending: EMERGENCY MEDICINE
Payer: COMMERCIAL

## 2021-01-01 ENCOUNTER — APPOINTMENT (OUTPATIENT)
Dept: CT IMAGING | Age: 36
End: 2021-01-01
Attending: EMERGENCY MEDICINE
Payer: COMMERCIAL

## 2021-01-01 ENCOUNTER — HOSPITAL ENCOUNTER (EMERGENCY)
Age: 36
End: 2021-12-23
Attending: EMERGENCY MEDICINE
Payer: COMMERCIAL

## 2021-01-01 VITALS
TEMPERATURE: 98.3 F | RESPIRATION RATE: 18 BRPM | SYSTOLIC BLOOD PRESSURE: 122 MMHG | OXYGEN SATURATION: 98 % | DIASTOLIC BLOOD PRESSURE: 81 MMHG | HEART RATE: 119 BPM

## 2021-01-01 DIAGNOSIS — F41.9 ANXIETY AND DEPRESSION: ICD-10-CM

## 2021-01-01 DIAGNOSIS — F32.A ANXIETY AND DEPRESSION: ICD-10-CM

## 2021-01-01 DIAGNOSIS — I46.9 CARDIAC ARREST (HCC): Primary | ICD-10-CM

## 2021-01-01 DIAGNOSIS — R06.02 SOB (SHORTNESS OF BREATH): Primary | ICD-10-CM

## 2021-01-01 DIAGNOSIS — J96.00 ACUTE RESPIRATORY FAILURE, UNSPECIFIED WHETHER WITH HYPOXIA OR HYPERCAPNIA (HCC): ICD-10-CM

## 2021-01-01 LAB
ALBUMIN SERPL-MCNC: 4.1 G/DL (ref 3.5–5)
ALBUMIN/GLOB SERPL: 1.1 {RATIO} (ref 1.1–2.2)
ALP SERPL-CCNC: 57 U/L (ref 45–117)
ALT SERPL-CCNC: 13 U/L (ref 12–78)
ANION GAP SERPL CALC-SCNC: 15 MMOL/L (ref 5–15)
APTT PPP: 23.6 SEC (ref 22.1–31)
AST SERPL-CCNC: 10 U/L (ref 15–37)
ATRIAL RATE: 104 BPM
BASOPHILS # BLD: 0 K/UL (ref 0–0.1)
BASOPHILS NFR BLD: 0 % (ref 0–1)
BILIRUB SERPL-MCNC: 0.4 MG/DL (ref 0.2–1)
BNP SERPL-MCNC: 59 PG/ML (ref 0–125)
BUN SERPL-MCNC: 24 MG/DL (ref 6–20)
BUN/CREAT SERPL: 18 (ref 12–20)
CALCIUM SERPL-MCNC: 9 MG/DL (ref 8.5–10.1)
CALCULATED P AXIS, ECG09: 57 DEGREES
CALCULATED R AXIS, ECG10: 75 DEGREES
CALCULATED T AXIS, ECG11: 46 DEGREES
CHLORIDE SERPL-SCNC: 103 MMOL/L (ref 97–108)
CK SERPL-CCNC: 56 U/L (ref 26–192)
CO2 SERPL-SCNC: 22 MMOL/L (ref 21–32)
CREAT SERPL-MCNC: 1.34 MG/DL (ref 0.55–1.02)
D DIMER PPP FEU-MCNC: 0.58 MG/L FEU (ref 0–0.65)
DIAGNOSIS, 93000: NORMAL
DIFFERENTIAL METHOD BLD: ABNORMAL
EOSINOPHIL # BLD: 0 K/UL (ref 0–0.4)
EOSINOPHIL NFR BLD: 0 % (ref 0–7)
ERYTHROCYTE [DISTWIDTH] IN BLOOD BY AUTOMATED COUNT: 13.3 % (ref 11.5–14.5)
GLOBULIN SER CALC-MCNC: 3.6 G/DL (ref 2–4)
GLUCOSE SERPL-MCNC: 168 MG/DL (ref 65–100)
HCT VFR BLD AUTO: 39.3 % (ref 35–47)
HGB BLD-MCNC: 12.4 G/DL (ref 11.5–16)
IMM GRANULOCYTES # BLD AUTO: 0 K/UL (ref 0–0.04)
IMM GRANULOCYTES NFR BLD AUTO: 0 % (ref 0–0.5)
INR PPP: 1.1 (ref 0.9–1.1)
LYMPHOCYTES # BLD: 1.3 K/UL (ref 0.8–3.5)
LYMPHOCYTES NFR BLD: 16 % (ref 12–49)
MCH RBC QN AUTO: 27.4 PG (ref 26–34)
MCHC RBC AUTO-ENTMCNC: 31.6 G/DL (ref 30–36.5)
MCV RBC AUTO: 86.8 FL (ref 80–99)
MONOCYTES # BLD: 0.5 K/UL (ref 0–1)
MONOCYTES NFR BLD: 6 % (ref 5–13)
NEUTS SEG # BLD: 6.3 K/UL (ref 1.8–8)
NEUTS SEG NFR BLD: 78 % (ref 32–75)
NRBC # BLD: 0 K/UL (ref 0–0.01)
NRBC BLD-RTO: 0 PER 100 WBC
P-R INTERVAL, ECG05: 136 MS
PHOSPHATE SERPL-MCNC: 4.6 MG/DL (ref 2.6–4.7)
PLATELET # BLD AUTO: 282 K/UL (ref 150–400)
PMV BLD AUTO: 9.4 FL (ref 8.9–12.9)
POTASSIUM SERPL-SCNC: 3.6 MMOL/L (ref 3.5–5.1)
PROT SERPL-MCNC: 7.7 G/DL (ref 6.4–8.2)
PROTHROMBIN TIME: 10.7 SEC (ref 9–11.1)
Q-T INTERVAL, ECG07: 422 MS
QRS DURATION, ECG06: 84 MS
QTC CALCULATION (BEZET), ECG08: 554 MS
RBC # BLD AUTO: 4.53 M/UL (ref 3.8–5.2)
SODIUM SERPL-SCNC: 140 MMOL/L (ref 136–145)
THERAPEUTIC RANGE,PTTT: NORMAL SECS (ref 58–77)
TROPONIN I SERPL-MCNC: <0.05 NG/ML
VENTRICULAR RATE, ECG03: 104 BPM
WBC # BLD AUTO: 8.2 K/UL (ref 3.6–11)

## 2021-01-01 PROCEDURE — 36415 COLL VENOUS BLD VENIPUNCTURE: CPT

## 2021-01-01 PROCEDURE — 92950 HEART/LUNG RESUSCITATION CPR: CPT

## 2021-01-01 PROCEDURE — 84100 ASSAY OF PHOSPHORUS: CPT

## 2021-01-01 PROCEDURE — 82550 ASSAY OF CK (CPK): CPT

## 2021-01-01 PROCEDURE — 85610 PROTHROMBIN TIME: CPT

## 2021-01-01 PROCEDURE — 74011000636 HC RX REV CODE- 636: Performed by: EMERGENCY MEDICINE

## 2021-01-01 PROCEDURE — 85379 FIBRIN DEGRADATION QUANT: CPT

## 2021-01-01 PROCEDURE — 99283 EMERGENCY DEPT VISIT LOW MDM: CPT

## 2021-01-01 PROCEDURE — 83880 ASSAY OF NATRIURETIC PEPTIDE: CPT

## 2021-01-01 PROCEDURE — 93005 ELECTROCARDIOGRAM TRACING: CPT

## 2021-01-01 PROCEDURE — 85730 THROMBOPLASTIN TIME PARTIAL: CPT

## 2021-01-01 PROCEDURE — 80053 COMPREHEN METABOLIC PANEL: CPT

## 2021-01-01 PROCEDURE — 85025 COMPLETE CBC W/AUTO DIFF WBC: CPT

## 2021-01-01 PROCEDURE — 84484 ASSAY OF TROPONIN QUANT: CPT

## 2021-01-01 PROCEDURE — 99284 EMERGENCY DEPT VISIT MOD MDM: CPT

## 2021-01-01 PROCEDURE — 75810000275 HC EMERGENCY DEPT VISIT NO LEVEL OF CARE

## 2021-01-01 PROCEDURE — 71275 CT ANGIOGRAPHY CHEST: CPT

## 2021-01-01 RX ADMIN — IOPAMIDOL 80 ML: 755 INJECTION, SOLUTION INTRAVENOUS at 23:03

## 2021-04-29 NOTE — ED NOTES
Updated Heber Gudino RN at [address] [place] [address] on pt status. [General Appearance - Alert] : alert [General Appearance - In No Acute Distress] : in no acute distress [Sclera] : the sclera and conjunctiva were normal [PERRL With Normal Accommodation] : pupils were equal in size, round, and reactive to light [Extraocular Movements] : extraocular movements were intact [Outer Ear] : the ears and nose were normal in appearance [Oropharynx] : the oropharynx was normal [Neck Appearance] : the appearance of the neck was normal [Neck Cervical Mass (___cm)] : no neck mass was observed [Jugular Venous Distention Increased] : there was no jugular-venous distention [Thyroid Nodule] : there were no palpable thyroid nodules [Auscultation Breath Sounds / Voice Sounds] : lungs were clear to auscultation bilaterally [Heart Rate And Rhythm] : heart rate was normal and rhythm regular [Heart Sounds] : normal S1 and S2 [Heart Sounds Gallop] : no gallops [Murmurs] : no murmurs [Heart Sounds Pericardial Friction Rub] : no pericardial rub [Full Pulse] : the pedal pulses are present [Edema] : there was no peripheral edema [Abdomen Soft] : soft [Bowel Sounds] : normal bowel sounds [Abdomen Tenderness] : non-tender [Cervical Lymph Nodes Enlarged Posterior Bilaterally] : posterior cervical [Cervical Lymph Nodes Enlarged Anterior Bilaterally] : anterior cervical [Supraclavicular Lymph Nodes Enlarged Bilaterally] : supraclavicular [Axillary Lymph Nodes Enlarged Bilaterally] : axillary [Inguinal Lymph Nodes Enlarged Bilaterally] : inguinal [Involuntary Movements] : no involuntary movements were seen [___ (cm) Fistula] : [unfilled] (cm) fistula [Bruit] : a bruit was present [Thrill] : a thrill was present [] : no rash [No Focal Deficits] : no focal deficits [FreeTextEntry1] : tremors+ [Oriented To Time, Place, And Person] : oriented to person, place, and time [Impaired Insight] : insight and judgment were intact [Affect] : the affect was normal

## 2021-09-29 NOTE — Clinical Note
[facility]  [address]  [address]  [phone]    Work/School Note    Date: 9/29/2021    To Whom It May concern:    Delwin Najjar was seen and treated today in the emergency room by the following provider(s):  Attending Provider: Jamal Good MD.      Delwin Najjar is excused from work/school on 09/29/21 and 09/30/21. She is medically clear to return to work/school on 10/1/2021.        Sincerely,          Rikki Polanco MD

## 2021-09-30 NOTE — ED PROVIDER NOTES
The patient is a 49-year-old female with a past medical history significant for anxiety, depression, fluid retention, hypertension, pituitary tumor, sleep disturbance, substance abuse, suicide attempt, tachycardia who presents to the ED with a complaint of progressive dyspnea on exertion since this morning. [facility] then [facility] to this ER for further evaluation. She smokes 1 packs/day. She denies any fever chills, headache, sore throat, cough or congestion, nausea, vomiting, diarrhea, constipation, dysuria, sick contact, recent travel. The patient had a negative rapid Covid test.  She is vaccinated against COVID-19 virus. Past Medical History:   Diagnosis Date    Anxiety     Depression     Fluid retention     Hypertension     Ill-defined condition     pituitary tumor    Sleep disorder     Substance abuse ([facility] [address]     Suicidal thoughts     Tachycardia     Trauma        Past Surgical History:   Procedure Laterality Date    HX FREE SKIN GRAFT      HX ORTHOPAEDIC      right foot    HX TONSILLECTOMY           History reviewed. No pertinent family history.     Social History     Socioeconomic History    Marital status: SINGLE     Spouse name: Not on file    Number of children: Not on file    Years of education: Not on file    Highest education level: Not on file   Occupational History    Not on file   Tobacco Use    Smoking status: Current Some Day Smoker     Packs/day: 1.50    Smokeless tobacco: Never Used   Substance and Sexual Activity    Alcohol use: No     Comment: Not a current drinker    Drug use: Yes     Types: Marijuana, Benzodiazepines    Sexual activity: Not on file   Other Topics Concern     Service Not Asked    Blood Transfusions Not Asked    Caffeine Concern Not Asked    Occupational Exposure Not Asked    Hobby Hazards Not Asked    Sleep Concern Not Asked    Stress Concern Not Asked    Weight Concern Not Asked    Special Diet Not Asked    Back Care Not Asked    Exercise Not Asked    Bike Helmet Not Asked   [address] Not Asked    Self-Exams Not Asked   Social History Narrative    Not on file     Social Determinants of Health     Financial Resource Strain:     Difficulty of Paying Living Expenses:    Food Insecurity:     Worried About Running Out of Food in the Last Year:     Ran Out of Food in the Last Year:    Transportation Needs:     Lack of Transportation (Medical):  Lack of Transportation (Non-Medical):    Physical Activity:     Days of Exercise per Week:     Minutes of Exercise per Session:    Stress:     Feeling of Stress :    Social Connections:     Frequency of Communication with Friends and Family:     Frequency of Social Gatherings with Friends and Family:     Attends Yazdanism Services:     Active Member of Clubs or Organizations:     Attends Club or Organization Meetings:     Marital Status:    Intimate Partner Violence:     Fear of Current or Ex-Partner:     Emotionally Abused:     Physically Abused:     Sexually Abused: ALLERGIES: Latex    Review of Systems   All other systems reviewed and are negative. Vitals:    09/29/21 2140   BP: 127/86   Pulse: (!) 119   Resp: 18   Temp: 98.3 °F (36.8 °C)   SpO2: 100%            Physical Exam  Vitals and nursing note reviewed. CONSTITUTIONAL: Well-appearing; well-nourished; in no apparent distress  HEAD: Normocephalic; atraumatic  EYES: PERRL; EOM intact; conjunctiva and sclera are clear bilaterally. ENT: No rhinorrhea; normal pharynx with no tonsillar hypertrophy; mucous membranes pink/moist, no erythema, no exudate. NECK: Supple; non-tender; no cervical lymphadenopathy  CARD: Normal S1, S2; no murmurs, rubs, or gallops. Regular rate and rhythm. RESP: Normal respiratory effort; breath sounds clear and equal bilaterally; no wheezes, rhonchi, or rales.   ABD: Normal bowel sounds; non-distended; non-tender; no palpable organomegaly, no masses, no bruits. Back Exam: Normal inspection; no vertebral point tenderness, no CVA tenderness. Normal range of motion. EXT: Normal ROM in all four extremities; non-tender to palpation; no swelling or deformity; distal pulses are normal, no edema. SKIN: Warm; dry; no rash. NEURO:Alert and oriented x 3, coherent, LAVELLE-XII grossly intact, sensory and motor are non-focal.        MDM  Number of Diagnoses or Management Options  Diagnosis management comments: Assessment: 49-year-old female with subjective dyspnea on exertion was a fairly benign exam with stable vital signs and appears well. Differential diagnosis include anxiety, pneumonia, VTE, ACS, CHF, electrolyte abnormality and dehydration. Plan: EKG/lab/IV fluid/CTA of the chest/education, reassurance, symptomatic treatment/serial exam/ Monitor and Reevaluate. Amount and/or Complexity of Data Reviewed  Clinical lab tests: ordered and reviewed  Tests in the radiology section of CPT®: ordered and reviewed  Tests in the medicine section of CPT®: reviewed and ordered  Discussion of test results with the performing providers: yes  Decide to obtain previous medical records or to obtain history from someone other than the patient: yes  Obtain history from someone other than the patient: yes  Review and summarize past medical records: yes  Discuss the patient with other providers: yes  Independent visualization of images, tracings, or specimens: yes    Risk of Complications, Morbidity, and/or Mortality  Presenting problems: moderate  Diagnostic procedures: moderate  Management options: moderate           Procedures    ED EKG interpretation:  Rhythm: sinus tachycardia; and regular . Rate (approx.): 104; Axis: normal; P wave: normal; QRS interval: normal ; ST/T wave: normal; in  Lead: Diffusely; Other findings: borderline ekg. This EKG was interpreted by Armando Tomas MD,ED Provider.     Progress Note:   Pt has been reexamined by Armando Toams MD. Pt is feeling much better. Symptoms have improved. All available results have been reviewed with pt and any available family. Pt understands sx, dx, and tx in ED. Care plan has been outlined and questions have been answered. Pt is ready to go home. Will send home on dyspnea, anxiety and depression instruction. . Outpatient referral with PCP as needed. Written by Jody Haskins MD,12:45 AM    .   .

## 2021-12-23 NOTE — ED NOTES
Patient arrives to the ED via private vehicle unresponsive. Patient presents blue. Staff felt no pulse, CPR started.

## 2021-12-23 NOTE — PROGRESS NOTES
Notification that patient mother had arrived for ER 3. Patient mother, Mirza Serrano [phone] (c) or [phone] arrived to identify patient. Condolences given. Provided support during conference with doctor and viewing of patient, ministry of presence, grief support/counseling, and hospitality. Collaborated with staff. Advised of  availability. Visited by: Rickey Natarajan.  Yogi Ross[address] paging Service [phone] ([phone])

## 2021-12-23 NOTE — PROGRESS NOTES
Spiritual Care Assessment/Progress Note  [facility]      NAME: Chloé Caballero      MRN: [id number]  AGE: 39 y.o. SEX: female  Tenriism Affiliation:    Language: English     12/23/2021     Total Time (in minutes): 26     Spiritual Assessment begun in [address] through conversation with:         []Patient        [] Family    [x] Friend(s)        Reason for Consult: Family care     Spiritual beliefs: (Please include comment if needed)     [] Identifies with a rudy tradition:         [] Supported by a rudy community:            [] Claims no spiritual orientation:           [] Seeking spiritual identity:                [] Adheres to an individual form of spirituality:           [x] Not able to assess:                           Identified resources for coping:      [] Prayer                               [] Music                  [] Guided Imagery     [x] Family/friends                 [] Pet visits     [] Devotional reading                         [] Unknown     [] Other:                                               Interventions offered during this visit: (See comments for more details)          Family/Friend(s): Affirmation of emotions/emotional suffering,Coping skills reviewed/reinforced     Plan of Care:     [] Support spiritual and/or cultural needs    [] Support AMD and/or advance care planning process      [x] Support grieving process   [] Coordinate Rites and/or Rituals    [] Coordination with community clergy   [] No spiritual needs identified at this time   [] Detailed Plan of Care below (See Comments)  [] Make referral to Music Therapy  [] Make referral to Pet Therapy     [] Make referral to Addiction services  [] Make referral to [facility]  [] Make referral to Spiritual Care Partner  [] No future visits requested        [] Contact Spiritual Care for further referrals       Comments: Notification from ER of Family/Friends arrival for ER 3.  Patient friend Geetha Canchola was present in conference area.  was present during doctors notification of death. Simi Clarke departed prior to arrival of Elif's NOK. Provided anxiety containment through relaxation breathing, grief support/counseling; condolences given. Advised of  availability. Please contact Spiritual Care for any further referrals. Visited by: Jeniffer Lobo.  Alfie Galvan, [address] [place] [address] Road paging Service [phone])

## 2021-12-23 NOTE — ED NOTES
1502- Pulse check-- no pulse; asystole    1504- Pulse check-- no pulse; asystole.  Dr. Martínez Code pronounced time of death at 5

## 2021-12-23 NOTE — PROGRESS NOTES
Spiritual Care Assessment/Progress Note  [facility]      NAME: Jose Hayes      MRN: [id number]  AGE: 39 y.o. SEX: female  Roman Catholic Affiliation:    Language: English     12/23/2021     Total Time (in minutes): 16     Spiritual Assessment begun in [address] through conversation with:         []Patient        [] Family    [] Friend(s)        Reason for Consult: Death, ER     Spiritual beliefs: (Please include comment if needed)     [] Identifies with a rudy tradition:         [] Supported by a rudy community:            [] Claims no spiritual orientation:           [] Seeking spiritual identity:                [] Adheres to an individual form of spirituality:           [x] Not able to assess:                           Identified resources for coping:      [] Prayer                               [] Music                  [] Guided Imagery     [] Family/friends                 [] Pet visits     [] Devotional reading                         [x] Unknown     [] Other:                                              Interventions offered during this visit: (See comments for more details)                Plan of Care:     [] Support spiritual and/or cultural needs    [] Support AMD and/or advance care planning process      [x] Support grieving process   [] Coordinate Rites and/or Rituals    [] Coordination with community clergy   [] No spiritual needs identified at this time   [] Detailed Plan of Care below (See Comments)  [] Make referral to Music Therapy  [] Make referral to Pet Therapy     [] Make referral to Addiction services  [] Make referral to [facility]  [] Make referral to Spiritual Care Partner  [] No future visits requested        [] Contact Spiritual Care for further referrals     Comments: Notification of Death in ER 3. Patient pronounced upon arrival. At this time, there was no family present. Collaborated with ER staff and family are on their way.   They will contact  should further spiritual services be needed. Please contact Spiritual Care for any further referrals. Visited by: Jose Soria.  Virginiacapojeronimo Mireles, [address] [place] [address] Road paging Service [phone])

## 2021-12-23 NOTE — ED PROVIDER NOTES
HPI   This is a 66-year-old woman who presents to the emergency department after being found unresponsive. History obtained from nursing staff in the waiting room. Patient reportedly brought to the emergency department by a friend after being found unresponsive. She was reportedly brought in by private vehicle. On arrival she had no pulse. No other history is able to be obtained at this time. Past Medical History:   Diagnosis Date    Anxiety     Depression     Fluid retention     Hypertension     Ill-defined condition     pituitary tumor    Sleep disorder     Substance abuse ([facility] [address]     Suicidal thoughts     Tachycardia     Trauma        Past Surgical History:   Procedure Laterality Date    HX FREE SKIN GRAFT      HX ORTHOPAEDIC      right foot    HX TONSILLECTOMY           No family history on file.     Social History     Socioeconomic History    Marital status: SINGLE     Spouse name: Not on file    Number of children: Not on file    Years of education: Not on file    Highest education level: Not on file   Occupational History    Not on file   Tobacco Use    Smoking status: Current Some Day Smoker     Packs/day: 1.50    Smokeless tobacco: Never Used   Substance and Sexual Activity    Alcohol use: No     Comment: Not a current drinker    Drug use: Yes     Types: Marijuana, Benzodiazepines    Sexual activity: Not on file   Other Topics Concern     Service Not Asked    Blood Transfusions Not Asked    Caffeine Concern Not Asked    Occupational Exposure Not Asked    Hobby Hazards Not Asked    Sleep Concern Not Asked    Stress Concern Not Asked    Weight Concern Not Asked    Special Diet Not Asked    Back Care Not Asked    Exercise Not Asked    Bike Helmet Not Asked   [address] Not Asked    Self-Exams Not Asked   Social History Narrative    Not on file     Social Determinants of Health     Financial Resource Strain:     Difficulty of Paying Living Expenses: Not on file Food Insecurity:     Worried About Running Out of Food in the Last Year: Not on file    Josué of Food in the Last Year: Not on file   Transportation Needs:     Lack of Transportation (Medical): Not on file    Lack of Transportation (Non-Medical): Not on file   Physical Activity:     Days of Exercise per Week: Not on file    Minutes of Exercise per Session: Not on file   Stress:     Feeling of Stress : Not on file   Social Connections:     Frequency of Communication with Friends and Family: Not on file    Frequency of Social Gatherings with Friends and Family: Not on file    Attends Roman Catholic Services: Not on file    Active Member of [address] [place] [address] [employer] or Organizations: Not on file    Attends Club or Organization Meetings: Not on file    Marital Status: Not on file   Intimate Partner Violence:     Fear of Current or Ex-Partner: Not on file    Emotionally Abused: Not on file    Physically Abused: Not on file    Sexually Abused: Not on file   Housing Stability:     Unable to Pay for Housing in the Last Year: Not on file    Number of [place] in the Last Year: Not on file    Unstable Housing in the Last Year: Not on file         ALLERGIES: Latex    Review of Systems   Unable to to perform a complete review of systems as the patient is pulseless and unresponsive. There were no vitals filed for this visit. Physical Exam  Constitutional:       Comments: Patient appears ashen and mottled   HENT:      Head:      Comments: No obvious signs of head trauma on external examination  Eyes:      Comments: Pupils are fixed and dilated bilaterally   Neck:      Comments: Trachea midline. No appreciable JVD  Cardiovascular:      Comments: Pulseless. Pulmonary:      Comments: Breath sounds present bilaterally during the bag-valve-mask ventilation  Abdominal:      Comments: Abdomen appears distended but soft. No rigidity   Musculoskeletal:      Comments: Extremities are rigid.  Holding her bilateral upper extremities in a slightly flexed position   Skin:     Comments: Cold extremities. Ashen and violaceous skin changes in the bilateral upper and lower extremities as well as abdomen and chest. Next to no capillary refill   Neurological:      Comments: Patient is unresponsive. Her extremities are rigid. She is. GCS 3          MDM   15-year-old woman who presents after being found unresponsive. Upon arrival to her room she is pulseless. She is mottled. She is rigid. No spontaneous respirations noted. Asystole was on the monitor. Chest compressions started and the patient was being bagged via bag valve mask. Based on the patient's exam and the fact that she is in rigor mortis, she seems to have been  for some period of time. 2 rounds of chest compressions were performed with no return of spontaneous circulation. Given the likely futility of this, decision was made to perform no further intervention.  Time of death 3:04 PM    Procedures

## 2021-12-24 NOTE — FORENSIC NURSE
Forensic exam completed and photographs obtained. Findings discussed with provider. Law enforcement currently involved.  SBAR handoff given to  Wendy Reyes RN to relinquish care back to [facility].

## 2021-12-24 NOTE — ED NOTES
RN spoke with [address] with [facility] police via phone. Per [address], body is able to be taken to the morgue and released to medical examiner. Nursing Supervisor reaching out to medical examiner to determine if they want body transported to the [facility].

## 2021-12-24 NOTE — ED NOTES
The following valuables were found on the patient and left on the patient:    1 silver colored watch  1 brown/yellow and silver colored ring  1 brown/yellow and silver colored band  2 silver colored ring  1 silver colored necklace  1 black colored necklace  1 silver colored hoop on left ear  1 silver colored stud on left ear  1 silver colored earring on left ear  1 silver colored hoop on right ear  1 silver colored stud on R ear  1 pair boots  1 underwear  1 pair pants cut by ED staff  1 shirt cut by ED staff  1 sports bra cut by ED staff